# Patient Record
Sex: FEMALE | Race: ASIAN | URBAN - METROPOLITAN AREA
[De-identification: names, ages, dates, MRNs, and addresses within clinical notes are randomized per-mention and may not be internally consistent; named-entity substitution may affect disease eponyms.]

---

## 2022-02-24 ENCOUNTER — APPOINTMENT (RX ONLY)
Dept: URBAN - METROPOLITAN AREA CLINIC 22 | Facility: CLINIC | Age: 37
Setting detail: DERMATOLOGY
End: 2022-02-24

## 2022-02-24 DIAGNOSIS — L259 CONTACT DERMATITIS AND OTHER ECZEMA, UNSPECIFIED CAUSE: ICD-10-CM

## 2022-02-24 PROBLEM — L30.9 DERMATITIS, UNSPECIFIED: Status: ACTIVE | Noted: 2022-02-24

## 2022-02-24 PROCEDURE — ? COUNSELING

## 2022-02-24 PROCEDURE — ? PHOTO-DOCUMENTATION

## 2022-02-24 PROCEDURE — 99204 OFFICE O/P NEW MOD 45 MIN: CPT

## 2022-02-24 PROCEDURE — ? PRESCRIPTION

## 2022-02-24 PROCEDURE — ? ADDITIONAL NOTES

## 2022-02-24 RX ORDER — PIMECROLIMUS 10 MG/G
1 CREAM TOPICAL QD
Qty: 60 | Refills: 3 | Status: ERX | COMMUNITY
Start: 2022-02-24

## 2022-02-24 RX ORDER — DOXYCYCLINE 100 MG/1
1 CAPSULE ORAL QD
Qty: 30 | Refills: 1 | Status: ERX | COMMUNITY
Start: 2022-02-24

## 2022-02-24 RX ADMIN — PIMECROLIMUS 1: 10 CREAM TOPICAL at 00:00

## 2022-02-24 RX ADMIN — DOXYCYCLINE 1: 100 CAPSULE ORAL at 00:00

## 2022-02-24 ASSESSMENT — LOCATION SIMPLE DESCRIPTION DERM: LOCATION SIMPLE: LEFT CHEEK

## 2022-02-24 ASSESSMENT — LOCATION ZONE DERM: LOCATION ZONE: FACE

## 2022-02-24 ASSESSMENT — LOCATION DETAILED DESCRIPTION DERM: LOCATION DETAILED: LEFT INFERIOR CENTRAL MALAR CHEEK

## 2022-02-24 NOTE — PROCEDURE: ADDITIONAL NOTES
Additional Notes: Slightly improving facial rash/pustules/irritation x 3 months. \\nPossible reaction to products, patient has eliminated several steps but maintains a few.  Reviewed this, patient will just use vanicream cleanser and moisturizer for now as well as medicated cream and oral medication. \\n1 month follow up.
Render Risk Assessment In Note?: no
Detail Level: Simple

## 2022-02-24 NOTE — HPI: RASH (ALLERGIC CONTACT DERMATITIS)
How Severe Is Your Rash?: mild
Is This A New Presentation, Or A Follow-Up?: Rash
Additional History: Used a niacinamide cream

## 2022-02-25 RX ORDER — PIMECROLIMUS 10 MG/G
CREAM TOPICAL QD
Qty: 60 | Refills: 3 | Status: ERX

## 2022-02-25 RX ORDER — DOXYCYCLINE 100 MG/1
CAPSULE ORAL QD
Qty: 30 | Refills: 1 | Status: CANCELLED

## 2022-03-24 ENCOUNTER — APPOINTMENT (RX ONLY)
Dept: URBAN - METROPOLITAN AREA CLINIC 22 | Facility: CLINIC | Age: 37
Setting detail: DERMATOLOGY
End: 2022-03-24

## 2022-03-24 DIAGNOSIS — L259 CONTACT DERMATITIS AND OTHER ECZEMA, UNSPECIFIED CAUSE: ICD-10-CM

## 2022-03-24 PROBLEM — L30.9 DERMATITIS, UNSPECIFIED: Status: ACTIVE | Noted: 2022-03-24

## 2022-03-24 PROCEDURE — ? COUNSELING

## 2022-03-24 PROCEDURE — 99213 OFFICE O/P EST LOW 20 MIN: CPT

## 2022-03-24 PROCEDURE — ? ADDITIONAL NOTES

## 2022-03-24 ASSESSMENT — LOCATION SIMPLE DESCRIPTION DERM: LOCATION SIMPLE: LEFT CHEEK

## 2022-03-24 ASSESSMENT — LOCATION DETAILED DESCRIPTION DERM: LOCATION DETAILED: LEFT INFERIOR CENTRAL MALAR CHEEK

## 2022-03-24 ASSESSMENT — LOCATION ZONE DERM: LOCATION ZONE: FACE

## 2022-03-24 NOTE — PROCEDURE: ADDITIONAL NOTES
Additional Notes: Improved with pimecrolimus and doxycycline. Will discontinue using doxycycline \\nHas been able to add back most of her regular products without issue.  Follow up prn.
Render Risk Assessment In Note?: no
Detail Level: Simple